# Patient Record
Sex: FEMALE | Race: WHITE
[De-identification: names, ages, dates, MRNs, and addresses within clinical notes are randomized per-mention and may not be internally consistent; named-entity substitution may affect disease eponyms.]

---

## 2019-07-06 ENCOUNTER — HOSPITAL ENCOUNTER (EMERGENCY)
Dept: HOSPITAL 38 - CC.ED | Age: 39
Discharge: HOME | End: 2019-07-06
Payer: COMMERCIAL

## 2019-07-06 VITALS — HEART RATE: 83 BPM | SYSTOLIC BLOOD PRESSURE: 134 MMHG | DIASTOLIC BLOOD PRESSURE: 98 MMHG

## 2019-07-06 DIAGNOSIS — Z91.030: ICD-10-CM

## 2019-07-06 DIAGNOSIS — Z88.0: ICD-10-CM

## 2019-07-06 DIAGNOSIS — H11.421: ICD-10-CM

## 2019-07-06 DIAGNOSIS — T78.49XA: Primary | ICD-10-CM

## 2019-07-06 PROCEDURE — 96372 THER/PROPH/DIAG INJ SC/IM: CPT

## 2019-07-06 PROCEDURE — 99283 EMERGENCY DEPT VISIT LOW MDM: CPT

## 2019-07-06 NOTE — EDM.PDOC
ED HPI GENERAL MEDICAL PROBLEM





- General


Chief Complaint: Eye Problems


Stated Complaint: eye swollen shut


Time Seen by Provider: 19 13:19


Source of Information: Reports: Patient


History Limitations: Reports: No Limitations





- History of Present Illness


INITIAL COMMENTS - FREE TEXT/NARRATIVE: 


Patient presents to ER with complaints of eye swelling.  Was cutting up onions 

and garlic on a cutting board this am before noon, touched her eye and noted 

that it did cause some irritation.  Showered and rinsed her eye out 

continuously at home but eye has continued to swell.  Notes feels "like jello 

in her eye".  Vision is intact but cannot open lids.  Has been tearing.  Not 

aware of any reaction to either of these foods before.  Eats them without 

difficulty.  Cutting board had been cleaned prior with Evelyn dish soap. No 

dysphagia.  No breathing difficulties


 


Onset: Today, Gradual


Duration: Hour(s):


Location: Reports: Face


Quality: Reports: Ache, Dull


Severity: Mild


Associated Symptoms: Reports: No Other Symptoms


Treatments PTA: Reports: Other (see below)


Other Treatments PTA: rinsed area well





- Related Data


 Allergies











Allergy/AdvReac Type Severity Reaction Status Date / Time


 


Penicillins Allergy Severe Respiratory Verified 19 13:07





   Distress  


 


bee stings Allergy Severe Respiratory Uncoded 16 00:36





   Distress  


 


blackberries Allergy Intermediate Hives Uncoded 16 00:36











Home Meds: 


 Home Meds





Cefuroxime Axetil [Ceftin] 1 tab PO BID 19 [History]











Past Medical History





- Past Health History


Medical/Surgical History: Denies Medical/Surgical History


HEENT History: Reports: Other (See Below)


Other HEENT History: currently has an ear infection


OB/GYN History: Reports: Pregnancy


Other OB/GYN History: 





Social & Family History





- Tobacco Use


Smoking Status *Q: Never Smoker





ED ROS GENERAL





- Review of Systems


Review Of Systems: See Below


Constitutional: Denies: Fever, Chills, Malaise, Weakness


HEENT: Reports: Eye Discharge, Eye Pain.  Denies: Ear Pain, Throat Pain, Throat 

Swelling


Respiratory: Reports: No Symptoms


Cardiovascular: Reports: No Symptoms


GI/Abdominal: Reports: No Symptoms


Neurological: Reports: No Symptoms





ED EXAM GENERAL W FULL EYE





- Physical Exam


Exam: See Below


Exam Limited By: No Limitations


General Appearance: Alert, WD/WN, Mild Distress


Eye Exam: Right Eye: Conjunctival Injection, Other (conjunctival swelling to 

right eye noted), Bilateral Eye: PERRL


Eyelids: Right: Edema, Left: Normal Appearance


Conjunctiva & Sclera: Right: Conjunctival Edema, Injected


Extraocular Movements: Bilateral: Intact


Pupils: Normal Accommodation


Pupillary Size: Right: 4 mm


Pupillary Reaction: Bilateral: Brisk


Ears: Normal External Exam, Other (mild redness noted to left TM)


Nose: Normal Inspection, Normal Mucosa, No Blood


Throat/Mouth: Normal Inspection, Normal Oropharynx


Head: Normocephalic


Neck: Normal Inspection, Supple, Non-Tender





Course





- Vital Signs


Last Recorded V/S: 


 Last Vital Signs











Temp  98.0 F   19 13:03


 


Pulse  83   19 13:03


 


Resp  16   19 13:03


 


BP  134/98 H  19 13:03


 


Pulse Ox  94 L  19 13:03














- Orders/Labs/Meds


Meds: 


Medications














Discontinued Medications














Generic Name Dose Route Start Last Admin





  Trade Name Keanu  PRN Reason Stop Dose Admin


 


Methylprednisolone Sodium Succinate  125 mg  19 13:22  19 13:32





  Solu-Medrol  IVPUSH  19 13:23  Not Given





  NOW ONE   





     





     





     





     


 


Methylprednisolone Sodium Succinate  125 mg  19 13:28  19 13:31





  Solu-Medrol  IM  19 13:29  125 mg





  NOW ONE   Administration





     





     





     





     














- Re-Assessments/Exams


Free Text/Narrative Re-Assessment/Exam: 





19 1325


contacted Dr. Cazares's, optometry, for recommendations.   Advised to ice, 

use artificial tears and take Zyrtec.  Should improve in 24 hours





Departure





- Departure


Time of Disposition: 13:36


Disposition: Home, Self-Care 01


Condition: Good


Clinical Impression: 


 Allergic reaction








- Discharge Information


*PRESCRIPTION DRUG MONITORING PROGRAM REVIEWED*: No


*COPY OF PRESCRIPTION DRUG MONITORING REPORT IN PATIENT LUCIA: No


Referrals: 


PCP,None [Primary Care Provider] - 


Forms:  ED Department Discharge


Additional Instructions: 


1.  Ice every 2 hours to eye


2.  Artificial tears every 2 hours


3.  Take Zyrtec daily for the next few days


4.  Follow up if any ongoing concerns.

## 2019-07-28 ENCOUNTER — HOSPITAL ENCOUNTER (EMERGENCY)
Dept: HOSPITAL 38 - CC.ED | Age: 39
Discharge: HOME | End: 2019-07-28
Payer: COMMERCIAL

## 2019-07-28 VITALS — SYSTOLIC BLOOD PRESSURE: 142 MMHG | DIASTOLIC BLOOD PRESSURE: 95 MMHG | HEART RATE: 88 BPM

## 2019-07-28 DIAGNOSIS — Z91.018: ICD-10-CM

## 2019-07-28 DIAGNOSIS — R10.9: Primary | ICD-10-CM

## 2019-07-28 DIAGNOSIS — Z88.1: ICD-10-CM

## 2019-07-28 DIAGNOSIS — Z91.030: ICD-10-CM

## 2019-07-28 LAB
CHLORIDE SERPL-SCNC: 100 MEQ/L (ref 98–106)
SODIUM SERPL-SCNC: 138 MEQ/L (ref 136–145)

## 2019-07-28 NOTE — EDM.PDOC
ED HPI GENERAL MEDICAL PROBLEM





- General


Chief Complaint: Abdominal Pain


Stated Complaint: ABDOMINAL PAIN


Time Seen by Provider: 19 21:34


Source of Information: Reports: Patient, Family


History Limitations: Reports: No Limitations





- History of Present Illness


INITIAL COMMENTS - FREE TEXT/NARRATIVE: 





in with c/o had a severe abd pain that lasted for 5 minutes and then decreased 

in pain for about 30 minutes, sx are now almost gone, had nausea, no VDC, no cp 

or sob, no fever or chills, last BM was today and was normal, no neck/back pain 

or stiffness, no cp or irregular heart beats, no calf pain, redness or swelling 


Onset: Today


Duration: Minutes:


Location: Reports: Abdomen


Quality: Reports: Ache


Severity: Severe


Improves with: Reports: None


Worsens with: Reports: None


Associated Symptoms: Reports: No Other Symptoms, Nausea/Vomiting.  Denies: 

Chest Pain, Cough, Fever/Chills, Headaches, Shortness of Breath, Weakness


Treatments PTA: Reports: Other (see below) (none)





- Related Data


 Allergies











Allergy/AdvReac Type Severity Reaction Status Date / Time


 


Penicillins Allergy Severe Respiratory Verified 19 21:19





   Distress  


 


bee stings Allergy Severe Respiratory Uncoded 19 21:19





   Distress  


 


blackberries Allergy Intermediate Hives Uncoded 19 21:19











Home Meds: 


 Home Meds





. [No Known Home Meds]  19 [History]











Past Medical History





- Past Health History


Medical/Surgical History: Denies Medical/Surgical History


HEENT History: Reports: Other (See Below)


Other HEENT History: currently has an ear infection


OB/GYN History: Reports: Pregnancy


Other OB/GYN History: 





Social & Family History





- Family History


Cardiac: Reports: Hypertension





- Tobacco Use


Smoking Status *Q: Unknown Ever Smoked





- Alcohol Use


Alcohol Use History: Yes


Alcohol Use Frequency: Rarely





- Living Situation & Occupation


Living situation: Reports: , with Family





ED ROS GENERAL





- Review of Systems


Review Of Systems: See Below


Constitutional: Reports: No Symptoms.  Denies: Fever, Chills


HEENT: Reports: No Symptoms


Respiratory: Reports: No Symptoms.  Denies: Shortness of Breath


Cardiovascular: Reports: No Symptoms.  Denies: Chest Pain


Endocrine: Reports: No Symptoms


GI/Abdominal: Reports: Abdominal Pain, Nausea.  Denies: Black Stool, Bloody 

Stool, Constipation, Distension, Melena, Vomiting


: Reports: No Symptoms


Musculoskeletal: Reports: No Symptoms.  Denies: Neck Pain, Back Pain


Skin: Reports: No Symptoms.  Denies: Bruising


Neurological: Reports: No Symptoms


Psychiatric: Reports: No Symptoms





ED EXAM, GI/ABD





- Physical Exam


Exam: See Below


Exam Limited By: No Limitations


General Appearance: Alert, WD/WN, No Apparent Distress, Obese


Ears: Normal External Exam


Nose: Normal Inspection


Throat/Mouth: Normal Inspection, Normal Lips


Head: Atraumatic, Normocephalic


Neck: Normal Inspection, Supple, Non-Tender, Full Range of Motion


Respiratory/Chest: No Respiratory Distress, Lungs Clear, Normal Breath Sounds


Cardiovascular: Normal Peripheral Pulses, Regular Rate, Rhythm, No Edema, No 

Murmur, Other (DP/PT pulses 2+ bilateral)


GI/Abdominal Exam: Normal Bowel Sounds, Soft, Tender (minimal abd tenderness 

with palpation in mid abd, above the umbilicus)


Back Exam: Normal Inspection, Full Range of Motion.  No: CVA Tenderness (L), 

CVA Tenderness (R)


Extremities: Normal Inspection, Normal Range of Motion, Non-Tender, No Pedal 

Edema, Normal Capillary Refill


Neurological: Alert, Oriented, Normal Cognition, Normal Gait, No Motor/Sensory 

Deficits


Psychiatric: Normal Affect, Normal Mood


Skin Exam: Warm, Dry, Intact, Normal Color





Course





- Vital Signs


Text/Narrative:: 





pt evaluated in the ED, cbc, cmp, HCG, and UA are neg, flat and upright abd 

shows some stool and normal bowel gas, no obstruction or free air, will dc home 

f/u with clinic this week. 


Last Recorded V/S: 


 Last Vital Signs











Temp  35.6 C   19 21:19


 


Pulse  88   19 21:19


 


Resp  20   19 21:19


 


BP  142/95 H  19 21:19


 


Pulse Ox  99   19 21:19














- Orders/Labs/Meds


Orders: 


 Active Orders 24 hr











 Category Date Time Status


 


 Abdomen 2V AP Flat Upright [CR] Stat Exams  19 21:41 Taken











Labs: 


 Laboratory Tests











  19 Range/Units





  21:41 21:41 21:41 


 


WBC  12.1 H    (5.0-10.0)  10^3/uL


 


RBC  4.39    (4.00-5.50)  10^6/uL


 


Hgb  13.1    (12.0-16.0)  g/dL


 


Hct  39.7    (37.0-47.0)  %


 


MCV  90.4    (82.0-94.0)  fL


 


MCH  29.8    (27.0-32.0)  pg


 


MCHC  33.0    (33.0-38.0)  g/dL


 


RDW Coeff of Sonia  12.6    (11.0-15.0)  %


 


Plt Count  285    (150-400)  10^3/uL


 


Neut % (Auto)  66.1    (35-85)  %


 


Lymph % (Auto)  27.0    (10-55)  %


 


Mono % (Auto)  5.9    (0-16)  %


 


Eos % (Auto)  0.8    (0-5)  %


 


Baso % (Auto)  0.2    (0-3)  %


 


Neut # (Auto)  7.97 H    (1.80-7.00)  10^3/uL


 


Lymph # (Auto)  3.26    (1.00-4.80)  10^3/uL


 


Mono # (Auto)  0.71    (0.00-0.80)  10^3/uL


 


Eos # (Auto)  0.10    (0.00-0.45)  10^3/uL


 


Baso # (Auto)  0.03    10^3/uL


 


Sodium   138   (136-145)  mEq/L


 


Potassium   4.2   (3.5-5.0)  mEq/L


 


Chloride   100   ()  mEq/L


 


Carbon Dioxide   27   (21-32)  mmol/L


 


BUN   16   (7-18)  mg/dL


 


Creatinine   0.9   (0.6-1.0)  mg/dL


 


Est Cr Clr Drug Dosing   75.52   mL/min


 


Estimated GFR (MDRD)   > 60   (>=60)  mL/min


 


Glucose   179 H   (75-99)  mg/dL


 


Calcium   9.1   (8.4-10.1)  mg/dL


 


Total Bilirubin   0.3   (0.0-1.0)  mg/dL


 


AST   14 L   (15-37)  U/L


 


ALT   28   (12-78)  U/L


 


Alkaline Phosphatase   58   ()  U/L


 


Total Protein   7.3   (6.4-8.2)  g/dL


 


Albumin   3.6   (3.4-5.0)  g/dL


 


Lipase   196   ()  U/L


 


HCG, Qual    Negative  














Departure





- Departure


Time of Disposition: 22:44


Disposition: Home, Self-Care 01


Condition: Good


Clinical Impression: 


 Abdominal pain








- Discharge Information


*PRESCRIPTION DRUG MONITORING PROGRAM REVIEWED*: Not Applicable


*COPY OF PRESCRIPTION DRUG MONITORING REPORT IN PATIENT LUCIA: Not Applicable


Instructions:  Abdominal Pain, Adult, Easy-to-Read


Referrals: 


PCP,None [Primary Care Provider] - 


Forms:  ED Department Discharge


Additional Instructions: 


increase fluids


follow up with the clinic this week


return to ER sooner if worse or problems 





- Problem List & Annotations


(1) Abdominal pain


SNOMED Code(s): 92658813


   Code(s): R10.9 - UNSPECIFIED ABDOMINAL PAIN   Status: Acute   Priority: Low 

  Current Visit: Yes   


Qualifiers: 


   Abdominal location: generalized   Qualified Code(s): R10.84 - Generalized 

abdominal pain   





- Problem List Review


Problem List Initiated/Reviewed/Updated: Yes





- My Orders


Last 24 Hours: 


My Active Orders





19 21:41


Abdomen 2V AP Flat Upright [CR] Stat 














- Assessment/Plan


Last 24 Hours: 


My Active Orders





19 21:41


Abdomen 2V AP Flat Upright [CR] Stat 











Plan: 





as above